# Patient Record
Sex: FEMALE | Race: WHITE
[De-identification: names, ages, dates, MRNs, and addresses within clinical notes are randomized per-mention and may not be internally consistent; named-entity substitution may affect disease eponyms.]

---

## 2020-10-15 ENCOUNTER — HOSPITAL ENCOUNTER (OUTPATIENT)
Dept: HOSPITAL 53 - M WUC | Age: 20
End: 2020-10-15
Attending: PHYSICIAN ASSISTANT
Payer: COMMERCIAL

## 2020-10-15 DIAGNOSIS — M54.6: Primary | ICD-10-CM

## 2020-10-15 NOTE — REP
INDICATION:

PAIN IN THORACIC SPINE



COMPARISON:

None.



TECHNIQUE:

AP, lateral, and swimmers views.



FINDINGS:

Alignment and kyphosis is maintained.  Vertebral bodies intact.  No acute fracture /

compression injury or subluxation.  No degenerative changes.  Paravertebral soft

tissues are normal.



IMPRESSION:

Normal thoracic spine series.



<Electronically signed by Monico Luna > 10/15/20 [id number]